# Patient Record
Sex: MALE
[De-identification: names, ages, dates, MRNs, and addresses within clinical notes are randomized per-mention and may not be internally consistent; named-entity substitution may affect disease eponyms.]

---

## 2018-01-31 ENCOUNTER — RX ONLY (RX ONLY)
Age: 83
End: 2018-01-31

## 2018-06-18 ENCOUNTER — RX ONLY (RX ONLY)
Age: 83
End: 2018-06-18

## 2019-08-12 ENCOUNTER — RX ONLY (RX ONLY)
Age: 84
End: 2019-08-12

## 2020-07-06 ENCOUNTER — RX ONLY (RX ONLY)
Age: 85
End: 2020-07-06

## 2020-08-12 ENCOUNTER — APPOINTMENT (OUTPATIENT)
Dept: URBAN - METROPOLITAN AREA CLINIC 184 | Age: 85
Setting detail: DERMATOLOGY
End: 2020-08-13

## 2020-08-12 PROBLEM — C44.311 BASAL CELL CARCINOMA OF SKIN OF NOSE: Status: ACTIVE | Noted: 2020-08-12

## 2020-08-12 PROCEDURE — OTHER PRESCRIPTION: OTHER

## 2020-08-12 PROCEDURE — 14061 TIS TRNFR E/N/E/L10.1-30SQCM: CPT

## 2020-08-12 PROCEDURE — OTHER RETURN TO REFERRING PROVIDER: OTHER

## 2020-08-12 PROCEDURE — OTHER MOHS SURGERY: OTHER

## 2020-08-12 PROCEDURE — 17312 MOHS ADDL STAGE: CPT

## 2020-08-12 PROCEDURE — OTHER CONSULTATION FOR MOHS SURGERY: OTHER

## 2020-08-12 PROCEDURE — 17311 MOHS 1 STAGE H/N/HF/G: CPT

## 2020-08-12 RX ORDER — DOXYCYCLINE HYCLATE 100 MG/1
TABLET, COATED ORAL BID
Qty: 14 | Refills: 0 | Status: ERX | COMMUNITY
Start: 2020-08-12

## 2020-08-12 NOTE — PROCEDURE: CONSULTATION FOR MOHS SURGERY
Detail Level: Detailed
Name Of The Referring Provider For Procedure: Dr. Zamudio
X Size Of Lesion In Cm (Optional): 0
Incorporate Mauc In Note: Yes

## 2020-08-12 NOTE — HPI: MOHS SURGERY CONSULTATION
Has The Cancer Been Biopsied Before?: has been previously biopsied
Who Is Your Referring Provider?: Dr. Zamudio
When Was Your Biopsy?: 7/6/20

## 2020-08-12 NOTE — PROCEDURE: MOHS SURGERY
elevated temperature Surgeon Performing Repair (Optional): Dr. aBbb Surgeon Performing Repair (Optional): Dr. Babb

## 2020-10-19 ENCOUNTER — RX ONLY (RX ONLY)
Age: 85
End: 2020-10-19

## 2021-09-23 NOTE — PROCEDURE: MOHS SURGERY
MOLECULAR PCR Alternatives Discussed Intro (Do Not Add Period): I discussed alternative treatments to Mohs surgery and specifically discussed the risks and benefits of

## 2023-01-01 NOTE — PROCEDURE: MOHS SURGERY
Vaginal Delivery Consent (Ear)/Introductory Paragraph: The rationale for Mohs was explained to the patient and consent was obtained. The risks, benefits and alternatives to therapy were discussed in detail. Specifically, the risks of ear deformity, infection, scarring, bleeding, prolonged wound healing, incomplete removal, allergy to anesthesia, nerve injury and recurrence were addressed. Prior to the procedure, the treatment site was clearly identified and confirmed by the patient. All components of Universal Protocol/PAUSE Rule completed.
